# Patient Record
Sex: FEMALE | Race: WHITE
[De-identification: names, ages, dates, MRNs, and addresses within clinical notes are randomized per-mention and may not be internally consistent; named-entity substitution may affect disease eponyms.]

---

## 2017-06-30 ENCOUNTER — HOSPITAL ENCOUNTER (OUTPATIENT)
Dept: HOSPITAL 76 - DI | Age: 62
Discharge: HOME | End: 2017-06-30
Attending: NURSE PRACTITIONER
Payer: COMMERCIAL

## 2017-06-30 DIAGNOSIS — M51.27: ICD-10-CM

## 2017-06-30 DIAGNOSIS — E88.2: ICD-10-CM

## 2017-06-30 DIAGNOSIS — M51.26: Primary | ICD-10-CM

## 2017-06-30 DIAGNOSIS — M47.896: ICD-10-CM

## 2017-06-30 DIAGNOSIS — M41.86: ICD-10-CM

## 2017-06-30 PROCEDURE — 72148 MRI LUMBAR SPINE W/O DYE: CPT

## 2017-06-30 NOTE — MRI REPORT
EXAM:

MRI LUMBAR SPINE WITHOUT CONTRAST

 

EXAM DATE: 6/30/2017 11:38 AM.

 

CLINICAL HISTORY: LOW BACK PAIN.

 

COMPARISON: None.

 

TECHNIQUE: Multiplanar, multisequence T1-weighted and fluid-sensitive sequences of the lumbar spine f
rom T12 to S1 without contrast. Other: None.

 

FINDINGS: 

Spinal Cord: The conus terminates at  T12-L1 no signal abnormality in the visualized spinal cord.

 

Alignment: Moderate rotatory lumbar levoscoliosis with the apex at L2-L3 level, with Gallo angle of 16
 degrees.

 

Bone Marrow: Five non-rib-bearing lumbar vertebral bodies are assumed.  no gross fractures. The T1 in
termediate and T2 hyperintense lesion associated with the left pedicle of T12 is favored to represent
 a benign hemangioma. The bone marrow is diffusely heterogeneous, likely representing fatty replaceme
nt of the marrow. NO bone marrow edema.

 

Disk Levels/Facets:

T12-L1: Mild disk bulge. Mild anterior dural compression. No significant central canal or neuroforami
nal narrowing.

 

L1-L2: Mild disk bulge. Mild anterior dural compression. No significant central canal narrowing. No n
euroforaminal narrowing.

 

L2-L3: Mild diffuse disk bulge. Mild anterior dural compression. No significant central canal narrowi
ng. No neuroforaminal narrowing.

 

L3-L4: Mild diffuse disk bulge. Mild bilateral facet arthropathy. Mild anterior dural compression. No
 significant central canal narrowing. No neuroforaminal narrowing.

 

L4-L5: Large diffuse disk bulge with superimposed bilateral foraminal protrusions. Moderate to severe
 bilateral facet arthropathy. Moderate to severe central canal narrowing. Severe right and moderate l
eft neuroforaminal narrowing. Likely at least mild bilateral lateral recess narrowing with mass effec
t on traversing bilateral L5 nerves.

 

L5-S1: Moderate diffuse disk bulge with superimposed left subarticular protrusion. Moderate bilateral
 facet arthropathy. Extensive epidural lipomatosis. The epidural lipomatosis results in severe centra
l canal narrowing. Mild right and severe left neuroforaminal narrowing. Moderate left lateral recess 
narrowing with mass effect on traversing left S1 nerve.

 

Musculature: Normal. No edema or fatty atrophy. 

 

Other: The visualized pelvic cavity is unremarkable.

 

 

IMPRESSION: 

1. Moderate multilevel degenerative spondylosis, as detailed above and summarized below, with the mos
t significant levels being L4-L5 and L5-S1. No evidence of acute fracture or bone marrow edema. Likel
y a hemangioma within the left pedicle of T12.

 

2. Moderate rotatory lumbar levoscoliosis with the apex at L2-L3 level, with Gallo angle of 16 degrees
.

 

3. L4-L5 level demonstrates moderate to severe central canal narrowing. Severe right and moderate lef
t neuroforaminal narrowing. Likely at least mild bilateral lateral recess narrowing with mass effect 
on traversing bilateral L5 nerves.

 

4. L5-S1 level demonstrates extensive epidural lipomatosis. The epidural lipomatosis results in sever
e central canal narrowing. Mild right and severe left neuroforaminal narrowing. Moderate left lateral
 recess narrowing with mass effect on traversing left S1 nerve.

 

 

Comment: The following findings are so common in adults without low back pain that while we report th
eir presence, they must be interpreted with caution and in the context of the clinical situation. (Re
teresa Carrizales et al, Spine 2001)

 

Prevalence of findings in patients without low back pain:

Disk degeneration (any evidence): 92%

Disk desiccation/T2 signal loss: 83%

Disk height loss: 56%

Disk bulge: 64%

Disk protrusion: 32%

Annular tear/high intensity zone: 38%

 

[facility]

Referring Provider Line: [phone]

 

SITE ID: [id number]

## 2023-03-16 ENCOUNTER — HOSPITAL ENCOUNTER (OUTPATIENT)
Dept: HOSPITAL 76 - SDS | Age: 68
Discharge: HOME | End: 2023-03-16
Attending: OPHTHALMOLOGY
Payer: MEDICARE

## 2023-03-16 VITALS — SYSTOLIC BLOOD PRESSURE: 140 MMHG | DIASTOLIC BLOOD PRESSURE: 76 MMHG

## 2023-03-16 DIAGNOSIS — E11.36: Primary | ICD-10-CM

## 2023-03-16 DIAGNOSIS — H25.11: ICD-10-CM

## 2023-03-16 DIAGNOSIS — E66.9: ICD-10-CM

## 2023-03-16 DIAGNOSIS — Z79.85: ICD-10-CM

## 2023-03-16 PROCEDURE — 66984 XCAPSL CTRC RMVL W/O ECP: CPT

## 2023-03-16 NOTE — OPERATIVE REPORT
Operative Report





- Other


Other Information/Narrative: 





Date of Surgery: 03/16/23


Preop Dx: Visually significant cataract right eye. This was the first cataract 

surgery.


Postop Dx: Same


Procedure: Phacoemulsification with posterior chamber toric intraocular lens 

implant right eye


Surgeon: Dr. Richard Valderrama


Anesthesia: Monitored anesthesia care


Complications: None


Operative Indications: This is a 67-year-old F with progressive vision loss in 

the right eye due to 3+ nuclear sclerotic cataract.   Best corrected visual 

acuity was 20/30 with glare to 20/80 vision in the right eye.  Indications for 

surgery were:


-   Overall decrease in vision


-   Difficulty seeing street signs


-   Difficulty with glare or bright lights in any situation


The patient was consented at length concerning the risks and benefits of 

cataract surgery after which the patient expressed a desire to proceed with 

surgery.  





Operative Procedure:  The patients cornea was marked in the pre-surgical area 

to indicate the axis for the toric intraocular lens. The patient was taken into 

OR#3 and placed under monitored anesthesia care.  A surgical time-out was 

conducted confirming correct patient, correct procedure, and correct surgical 

site.  The patient was given topical anesthesia and then prepped and draped in 

the usual sterile fashion.  The eye was entered at the 6 and 3 oclock 

positions.  Intracameral Shugarcaine was injected into the anterior chamber 

followed by a dispersive viscoelastic. A continuous-tear curvilinear 

capsulorhexis was performed. The nucleus was hydrodissected and phacoemulsified.

 The cortex was evacuated using automated infusion and aspiration.  A cohesive 

viscoelastic was injected into the capsular bag and a 24.5 diopter toric 

intraocular lens was inserted into the bag and rotated to axis 096. Infusion and

aspiration were used to evacuate the viscoelastic materials from the eye and the

IOL was verified to remain on axis. The wounds were hydrated and the eye 

inflated to physiologic pressure using balanced salt solution.  Approximately 

0.25ml of a mixture of triamcinolone and moxifloxacin was injected trans-

sclerally into the vitreous in the inferotemporal quadrant using a 30 gauge 

cannula.  An additional 0.25ml of a mixture of triamcinolone and moxifloxacin 

was injected subconjunctivally in the superior quadrant for infection and 

inflammation prophylaxis. Wound integrity was checked with Weck-Roxy sponges and 

the IOL axis was once again verified to be on the correct axis.  The patient was

taken from the operating room in good condition and given post-op instructions.

## 2023-03-16 NOTE — ANESTHESIA
Pre-Anesthesia VS, & Labs





- Diagnosis





R cataract





- Procedure





R PhacoIOL


Vital Signs: 





                                        











Temp Pulse Resp BP Pulse Ox O2 Flow Rate


 


 36.4 C L  73   16   166/89 H  98    


 


 23 07:42  23 07:42  23 07:42  23 07:42  23 07:42 

 











Height: 4 ft 10 in


Weight (kg): 86 kg


Body Mass Index: 39.6


BMI Classification: Obese





- NPO


>8 hours





- Pregnancy


Is Patient Pregnant?: No





- Lab Results


Current Lab Results: 





Laboratory Tests





23 08:00: POC Whole Bld Glucose 138 H











Home Medications and Allergies


Home Medications: 


Ambulatory Orders





Dulaglutide [Trulicity] 1.5 mg SQ ONCE 03/15/23 


Metoprolol Tartrate [Lopressor] 200 mg PO DAILY 03/15/23 


Simvastatin [Zocor] 40 mg PO DAILY 03/15/23 


Losartan Potassium 50 mg PO DAILY 23 











                                        





Dulaglutide [Trulicity] 1.5 mg SQ ONCE 03/15/23 


Metoprolol Tartrate [Lopressor] 200 mg PO DAILY 03/15/23 


Simvastatin [Zocor] 40 mg PO DAILY 03/15/23 


Losartan Potassium 50 mg PO DAILY 23 








Allergies/Adverse Reactions: 


                                    Allergies











Allergy/AdvReac Type Severity Reaction Status Date / Time


 


No Known Drug Allergies Allergy   Verified 03/15/23 13:03














Anes History & Medical History





- Anesthetic History


Anesthesia Complications: reports: No previous complications


Family history of Anesthesia Complications: Denies


Family history of Malignant Hyperthermia: Denies





- Medical History


Cardiovascular: reports: Hypertension, High cholesterol


Pulmonary: reports: None


Gastrointestinal: reports: Chronic constipation


Urinary: reports: None


Musculoskeletal: reports: Osteoarthritis


Endocrine/Autoimmune: reports: Type 2 diabetes


Skin: reports: None





- Surgical History


Eyes Ears Nose Throat (EENT): reports: Tonsil/Adenoidectomy


Gynecologic: reports:  section, Hysterectomy





Exam


General: Alert, Oriented x3, Cooperative


Dental: WNL


Mouth Opening: 3 Fingerbreadth


Neck Mobility: Normal


Mallampati classification: III


Thyromental Distance: 4-6 cm


Respiratory: Lungs clear


Cardiovascular: Regular rate





Plan


Anesthesia Type: MAC


Consent for Procedure(s) Verified and Reviewed: Yes


Code Status: Attempt Resuscitation


ASA classification: 2-Mild systemic disease


Is this case an emergency?: No

## 2023-03-16 NOTE — ANESTHESIA POST OP EVALUATION
Anesthesia Post Eval





- Post Anesthesia Eval


Vitals: 





                                Last Vital Signs











Temp  36.4 C L  03/16/23 09:30


 


Pulse  73   03/16/23 09:30


 


Resp  16   03/16/23 09:30


 


BP  140/76 H  03/16/23 09:30


 


Pulse Ox  96   03/16/23 09:30


 


O2 Flow Rate      











CV Function Including HR & BP: Stable


Pain Control: Satisfactory


Nausea & Vomiting: Negative


Mental Status: Baseline


Respiratory Status: Airway Patent


Hydration Status: Satisfactory


Anesthesia Complications: None

## 2024-08-05 ENCOUNTER — HOSPITAL ENCOUNTER (OUTPATIENT)
Dept: HOSPITAL 76 - DI.N | Age: 69
Discharge: HOME | End: 2024-08-05
Attending: FAMILY MEDICINE
Payer: MEDICARE

## 2024-08-05 DIAGNOSIS — M25.572: Primary | ICD-10-CM

## 2024-08-05 NOTE — XRAY REPORT
PROCEDURE:  Ankle 3+V LT

 

INDICATIONS:  PAIN IN LEFT ANKLE AND JOINTS OF LEFT FOOT

 

TECHNIQUE:  2 views of the ankle were acquired.  

 

COMPARISON:  None.

 

FINDINGS:  

 

Bones:  No fracture or subluxation is seen. The ankle mortise appears intact.

Soft tissues: Diffuse ill-defined soft tissue swelling is seen about the ankle.

 

 

IMPRESSION:  

No acute osseous abnormality seen.

 

 

 

Reviewed by: Jono Kapoor MD on 8/5/2024 4:24 PM PDT

Approved by: Jono Kapoor MD on 8/5/2024 4:24 PM PDT

 

 

Station ID:  [id number]